# Patient Record
Sex: MALE | Employment: FULL TIME | URBAN - METROPOLITAN AREA
[De-identification: names, ages, dates, MRNs, and addresses within clinical notes are randomized per-mention and may not be internally consistent; named-entity substitution may affect disease eponyms.]

---

## 2023-02-19 PROBLEM — K20.0 EOSINOPHILIC ESOPHAGITIS: Status: ACTIVE | Noted: 2023-02-19

## 2023-02-19 PROBLEM — R63.4 ABNORMAL WEIGHT LOSS: Status: ACTIVE | Noted: 2023-02-19

## 2023-02-19 PROBLEM — D72.819 LEUKOPENIA: Status: ACTIVE | Noted: 2023-02-19

## 2023-02-19 PROBLEM — R09.A2 GLOBUS SENSATION: Status: ACTIVE | Noted: 2023-02-19

## 2023-02-19 PROBLEM — H69.90 EUSTACHIAN TUBE DYSFUNCTION: Status: ACTIVE | Noted: 2023-02-19

## 2023-02-19 PROBLEM — E78.5 DYSLIPIDEMIA: Status: ACTIVE | Noted: 2023-02-19

## 2023-02-19 PROBLEM — G46.7 DYSARTHRIA-CLUMSY HAND SYNDROME: Status: ACTIVE | Noted: 2023-02-19

## 2023-02-19 PROBLEM — R13.10 DYSPHAGIA: Status: ACTIVE | Noted: 2023-02-19

## 2023-02-19 PROBLEM — R20.2 PARESTHESIA: Status: ACTIVE | Noted: 2023-02-19

## 2023-02-19 PROBLEM — M54.10 BACK PAIN WITH RADICULOPATHY: Status: ACTIVE | Noted: 2023-02-19

## 2023-02-19 PROBLEM — E10.9 DIABETES TYPE I (MULTI): Status: ACTIVE | Noted: 2023-02-19

## 2023-02-19 PROBLEM — E10.65 UNCONTROLLED TYPE 1 DIABETES MELLITUS WITH HYPERGLYCEMIA (MULTI): Status: ACTIVE | Noted: 2023-02-19

## 2023-02-19 PROBLEM — R13.10 TROUBLE SWALLOWING: Status: ACTIVE | Noted: 2023-02-19

## 2023-02-19 PROBLEM — K21.9 GERD (GASTROESOPHAGEAL REFLUX DISEASE): Status: ACTIVE | Noted: 2023-02-19

## 2023-02-19 PROBLEM — R59.9 LYMPH NODE ENLARGEMENT: Status: ACTIVE | Noted: 2023-02-19

## 2023-02-19 PROBLEM — K04.7 DENTAL INFECTION: Status: ACTIVE | Noted: 2023-02-19

## 2023-02-19 PROBLEM — Z97.8 PRESENCE OF OTHER SPECIFIED DEVICES: Status: ACTIVE | Noted: 2023-02-19

## 2023-02-19 PROBLEM — E55.9 VITAMIN D DEFICIENCY: Status: ACTIVE | Noted: 2023-02-19

## 2023-02-19 PROBLEM — R53.83 FATIGUE: Status: ACTIVE | Noted: 2023-02-19

## 2023-02-19 RX ORDER — BLOOD-GLUCOSE METER
EACH MISCELLANEOUS
COMMUNITY
Start: 2016-06-06 | End: 2024-01-15

## 2023-02-19 RX ORDER — AA/PROT/LYSINE/METHIO/VIT C/B6 50-12.5 MG
TABLET ORAL
COMMUNITY

## 2023-02-19 RX ORDER — ATORVASTATIN CALCIUM 40 MG/1
1 TABLET, FILM COATED ORAL DAILY
COMMUNITY
Start: 2016-12-14

## 2023-02-19 RX ORDER — OMEPRAZOLE 20 MG/1
1 CAPSULE, DELAYED RELEASE ORAL DAILY
COMMUNITY
Start: 2022-11-03

## 2023-02-19 RX ORDER — FLUTICASONE PROPIONATE 50 MCG
2 SPRAY, SUSPENSION (ML) NASAL DAILY
COMMUNITY

## 2023-02-19 RX ORDER — INSULIN ASPART 100 [IU]/ML
INJECTION, SOLUTION INTRAVENOUS; SUBCUTANEOUS
COMMUNITY
Start: 2022-04-04

## 2023-02-19 RX ORDER — PEN NEEDLE, DIABETIC 30 GX3/16"
NEEDLE, DISPOSABLE MISCELLANEOUS
COMMUNITY

## 2023-02-19 RX ORDER — INSULIN GLARGINE 100 [IU]/ML
24 INJECTION, SOLUTION SUBCUTANEOUS
COMMUNITY
Start: 2021-04-23

## 2023-02-19 RX ORDER — CHOLECALCIFEROL (VITAMIN D3) 25 MCG
TABLET ORAL
COMMUNITY
Start: 2021-11-23

## 2023-03-13 ENCOUNTER — NUTRITION (OUTPATIENT)
Dept: PRIMARY CARE | Facility: CLINIC | Age: 43
End: 2023-03-13
Payer: COMMERCIAL

## 2023-03-13 DIAGNOSIS — E10.65 UNCONTROLLED TYPE 1 DIABETES MELLITUS WITH HYPERGLYCEMIA (MULTI): Primary | ICD-10-CM

## 2023-03-13 PROCEDURE — 99211 OFF/OP EST MAY X REQ PHY/QHP: CPT | Performed by: FAMILY MEDICINE

## 2023-03-21 VITALS — WEIGHT: 143 LBS | BODY MASS INDEX: 22.4 KG/M2

## 2023-03-26 NOTE — PROGRESS NOTES
Assessment     Reason for Visit:  Steven Nunez is a 42 y.o. male who presents for Nutrition Counseling (Pt here for MNT regarding T1DM and elevated BGs )    Anthropometrics:  Anthropometrics  Weight: 64.9 kg (143 lb)         Food And Nutrient Intake:  Food and Nutrient History  Food and Nutrient History: Pt has struggled to take appropriate amount of bolus insulin for >1 year. When first diagnosed, was more comfortable with I:C ratio and SS, but after some hypoglycemic events and rapid BG drops following exercise days, he is more worried than ever to take additional insulin at meals. This has impacted ability to lower A1c - recently reports at ENDO appointment, was 10.2%, from 10.8%. Working on counting carbs correctly, dosing I:C ratio correctly and taking appropriate SS - is usually taking 1U of SS or less.  Energy Intake: Fair 50-75 %  GI Symptoms: early satiety  GI Symptoms greater than 2 weeks: yes  Sleep Duration/Quality: 7+ hrs continuous     Food Intake  Meal 1: 1 pkt oatmeal or protein waffles/pancakes, chickpea-based cereal with milk, fruit - approx 30g carb - maybe up to 45g but not often - tries to have turkey sausage, protein with this meal  Meal 2: 30-45g rice or pasta with meat, vegetable, fat  Meal 3: 30-45g rice or pasta, potato, with meat, vegetable, fat, dairy  Snacks: 15g granola bar, cheese, nuts, snack packs  Food Variety: Present    Food Preparation  Cooking: Patient  Grocery Shopping: Patient  Dining Out: 1 to 3 times a month                                       Food Supplement Intake  Oral Nutrition Supplements:  (Was taking glucerna with meals to help BGs, and was using [employer] to increase kcal intake but is no longer using these products)           Food And Nutrient Administration:            Eating Environment  Location: Home, Other (comment) (work and ED parking lot when worried about using more bolus insulin (SS))           Factors:                         Physical  Activities:  Physical Activity  Physical Activity History: Used to enjoy running, does volleyball weekly and home projects on weekends - feels like he becomes very insulin sensative when exercising and changes insulin for 48 hours afterwards, which causes BGs to get into 300+ : has experience with hypoglycemia and rapid BG drops that increase his anxiety with exercise           Knowledge Beliefs Attitudes and Behavior       Beliefs and Attitudes  Beliefs and Attitudes: Motivation (SOC: contemplative)                               Nutrition Focused Physical Exam:           Energy Needs  Estimated Energy Needs  Total Energy Estimated Needs (kCal): 3500 kCal  Method for Estimating Needs: for weight restoration        Diagnosis      Nutrition Diagnosis  Patient has Nutrition Diagnosis: Yes  Nutrition Diagnosis 1: Altered nutrition related to laboratory values  Related to (1): anxiety over using SS  As Evidenced by (1): BGs >250    Interventions/Recommendations   Nutrition Education  Nutrition Education Content: Content related nutrition education  Goals: Discussed using SS during work week - he feels more comfortable doing this during week when he is less active - we talked about his sensativity after exercise and that it's likely only 24 hours or less, but using less insulin for 2 days is causing significant hyperglycemia. Education on SS discussed - motivated to do this given A1c of 10.2% and wanting to get on insulin pump in future.  Nutrition Counseling  Strategies: Nutrition counseling based on motivational interviewing strategy, Nutrition counseling based on goal setting strategy  Goals: Goal to use 2U of SS at weekday meals as is appropriate per BG - plans to focus on work days. Discussed upcoming travel also - talking to ENDO about changing basal insulin prn.        There are no Patient Instructions on file for this visit.    Monitoring and Evaluation   Food/Nutrient Related History Monitoring  Monitoring and  Evaluation Plan: Energy intake, Meal/snack pattern, Amount of food  Knowledge/Beliefs/Attitudes  Monitoring and Evaluation Plan: Food and nutrition knowledge, Behavior adherence (using bolus insulin accurately)        Time Spent  Time spent directly with patient, family or caregiver: 60 minutes  Documentation Time: 15 minutes        Readiness to Change : Good  Level of Understanding : Excellent  Anticipated Compliant : Good

## 2023-04-21 ENCOUNTER — APPOINTMENT (OUTPATIENT)
Dept: PRIMARY CARE | Facility: CLINIC | Age: 43
End: 2023-04-21
Payer: COMMERCIAL

## 2023-04-28 ENCOUNTER — NUTRITION (OUTPATIENT)
Dept: PRIMARY CARE | Facility: CLINIC | Age: 43
End: 2023-04-28
Payer: COMMERCIAL

## 2023-04-28 VITALS — BODY MASS INDEX: 21.93 KG/M2 | WEIGHT: 140 LBS

## 2023-04-28 DIAGNOSIS — E10.65 UNCONTROLLED TYPE 1 DIABETES MELLITUS WITH HYPERGLYCEMIA (MULTI): Primary | ICD-10-CM

## 2023-04-28 PROCEDURE — 99211 OFF/OP EST MAY X REQ PHY/QHP: CPT | Performed by: FAMILY MEDICINE

## 2023-04-28 NOTE — PROGRESS NOTES
Assessment     Reason for Visit:  Steven Nunez is a 42 y.o. male who presents for Nutrition Counseling (T1DM)    Anthropometrics:  Anthropometrics  Weight: 63.5 kg (140 lb)         Food And Nutrient Intake:  Food and Nutrient History  Food and Nutrient History: Pt reports BGs around 250-300mg/dL mostly. Feeling better about taking I:C ratio and 1-2U of SS. Met with Endo this morning and is changing I:C to 1:10g and feels comfortable with this. Also plans to start using SS (1/50>150) consistently after seeing Endo. Pt is feeling less anxious about hypoglycemia with more consistent eating and movement habits - trusting carb ratio and SS more right now. Went on vacation and managed BGs well - was >200 most of the trip but felt okay with this. Has a trip towards the end of June for white water rafting/hiking and wants to get BGs <200 before then, along with gaining 5# by then.  Energy Intake: Fair 50-75 %     Food Intake  Meal 1: 45g  Meal 2: 45g  Meal 3: 45g  Snacks: 30g 3-4x/d, hungry q 2 hours  Food Variety: Present (started adding more fruit, beans, lentils and different starches recently - has been tired of same foods for past several months)                                                        Food And Nutrient Administration:                        Factors:                         Physical Activities:              Knowledge Beliefs Attitudes and Behavior                                       Nutrition Focused Physical Exam:           Energy Needs  Estimated Energy Needs  Total Energy Estimated Needs (kCal): 3500 kCal  Method for Estimating Needs: weight restoration in setting of high activity        Diagnosis      Nutrition Diagnosis  Patient has Nutrition Diagnosis: Yes  Nutrition Diagnosis 1: Altered nutrition related to laboratory values  Related to (1): trouble using I:C ratio and SS consistently  As Evidenced by (1): BGs >250    Interventions/Recommendations   Nutrition Education  Nutrition Education  Content: Content related nutrition education  Goals: Discussed weight restoration will require BGs <200 consistently and increased calorie intake, especially with being hungry q 2 hours - may try boost/glucerna or [employer] again. Clarified questions on Mediterranean diet.  Nutrition Counseling  Strategies: Nutrition counseling based on motivational interviewing strategy, Nutrition counseling based on goal setting strategy  Goals: Discussed goals for new I:C ratio and using SS consistently, desire to gain weight, desire to lower BGs, goals for upcoming trip in June, goals for being able to run again this summer by getting BGS <250 consistently and regaining some weight/muscle before then, plans to resume keeping food/BG/insulin log.        There are no Patient Instructions on file for this visit.    Monitoring and Evaluation   Food/Nutrient Related History Monitoring  Monitoring and Evaluation Plan: Meal/snack pattern, Amount of food, Carbohydrate intake  Knowledge/Beliefs/Attitudes  Monitoring and Evaluation Plan: Food and nutrition knowledge, Beliefs and attitudes, Physical activity  Biochemical Data, Medical Tests and Procedures  Monitoring and Evaluation Plan: Glucose/endocrine profile        Time Spent  Time spent directly with patient, family or caregiver: 45 minutes  Documentation Time: 10 minutes        Readiness to Change : Good  Level of Understanding : Excellent  Anticipated Compliant : Excellent

## 2023-06-08 ENCOUNTER — OFFICE VISIT (OUTPATIENT)
Dept: PRIMARY CARE | Facility: CLINIC | Age: 43
End: 2023-06-08
Payer: COMMERCIAL

## 2023-06-08 VITALS
WEIGHT: 142 LBS | RESPIRATION RATE: 16 BRPM | BODY MASS INDEX: 22.24 KG/M2 | DIASTOLIC BLOOD PRESSURE: 70 MMHG | SYSTOLIC BLOOD PRESSURE: 116 MMHG | TEMPERATURE: 99.1 F | OXYGEN SATURATION: 99 % | HEART RATE: 100 BPM

## 2023-06-08 DIAGNOSIS — R23.4 FISSURE IN SKIN OF FOOT: Primary | ICD-10-CM

## 2023-06-08 PROCEDURE — 3074F SYST BP LT 130 MM HG: CPT | Performed by: NURSE PRACTITIONER

## 2023-06-08 PROCEDURE — 3078F DIAST BP <80 MM HG: CPT | Performed by: NURSE PRACTITIONER

## 2023-06-08 PROCEDURE — 99213 OFFICE O/P EST LOW 20 MIN: CPT | Performed by: NURSE PRACTITIONER

## 2023-06-08 PROCEDURE — 1036F TOBACCO NON-USER: CPT | Performed by: NURSE PRACTITIONER

## 2023-06-08 RX ORDER — MUPIROCIN 20 MG/G
OINTMENT TOPICAL 3 TIMES DAILY
Qty: 22 G | Refills: 0 | Status: SHIPPED | OUTPATIENT
Start: 2023-06-08 | End: 2023-06-18

## 2023-06-08 ASSESSMENT — ENCOUNTER SYMPTOMS
MUSCULOSKELETAL NEGATIVE: 1
CONSTITUTIONAL NEGATIVE: 1
RESPIRATORY NEGATIVE: 1

## 2023-06-08 NOTE — PROGRESS NOTES
"Subjective   Patient ID: Steven Nunez is a 42 y.o. male who presents for Toe Pain. Skin cracking and peeling.    For the past three months, patient has had right toe cracking and peeling. He has been using a \"file board\" to file off dead skin and has been putting a \"toe cap\" on it to keep it covered. It does not itch and it is not painful. He does play volleyball sometimes in the sand, but says that this happened before he played volleyball.          Review of Systems   Constitutional: Negative.    HENT: Negative.     Respiratory: Negative.     Genitourinary: Negative.    Musculoskeletal: Negative.    Skin:  Positive for rash.     Objective   /70   Pulse 100   Temp 37.3 °C (99.1 °F)   Resp 16   Wt 64.4 kg (142 lb)   SpO2 99%   BMI 22.24 kg/m²     Physical Exam  Vitals reviewed.   Constitutional:       General: He is not in acute distress.     Appearance: Normal appearance. He is not ill-appearing or toxic-appearing.   HENT:      Head: Atraumatic.   Cardiovascular:      Rate and Rhythm: Normal rate and regular rhythm.      Heart sounds: Normal heart sounds. No murmur heard.  Pulmonary:      Effort: Pulmonary effort is normal.      Breath sounds: Normal breath sounds.   Skin:     General: Skin is warm and dry.      Comments: Dry cracked skin of the right great toe with fissures. There is no purulent drainage or signs of infection. No redness, flaking or itchiness between toes   Neurological:      Mental Status: He is alert.     Assessment/Plan   Problem List Items Addressed This Visit    None  Visit Diagnoses       Fissure in skin of foot    -  Primary    Relevant Medications    mupirocin (Bactroban) 2 % ointment    Other Relevant Orders    Referral to Podiatry        Patient is a Type 1 Diabetic, so will get patient in to see podiatry for foot care and further management. Referral placed and  staff to help pt schedule.  Patient to continue to use filing board to file down dead skin. He may " apply thick emollient lotion to the area (like cetaphil). Will prescribe mupirocin for pt to put on the areas of cracked skin. Advised ER for any worsening of symptoms or new/concerning symptoms; he agreed.

## 2023-06-09 ENCOUNTER — NUTRITION (OUTPATIENT)
Dept: PRIMARY CARE | Facility: CLINIC | Age: 43
End: 2023-06-09
Payer: COMMERCIAL

## 2023-06-09 DIAGNOSIS — E10.65 UNCONTROLLED TYPE 1 DIABETES MELLITUS WITH HYPERGLYCEMIA (MULTI): Primary | ICD-10-CM

## 2023-06-09 PROCEDURE — 99211 OFF/OP EST MAY X REQ PHY/QHP: CPT | Performed by: FAMILY MEDICINE

## 2023-06-09 NOTE — PROGRESS NOTES
Assessment     Reason for Visit:  Steven Nunez is a 42 y.o. male who presents for Nutrition Counseling (T1DM)    Anthropometrics:            Food And Nutrient Intake:  Food and Nutrient History  Food and Nutrient History: Pt has not been logging food/carbs/insulin/BGs as he had hoped 2/2 family stress and travel. Reports BGS in 200-300 range, at 347mg/dL in office today. Struggling with counting carbs correctly at meals and using SS correctly - usually using 1-2 fewer units of insulin per carb count and 1-2 U of SS even when 3-4 may be recommended. Upcoming trip to [place] and worried about BG control - wants to not take any humalog for fear of hypoglycemia. No BGs <180 in many months. Worried about heart health with family hx and recent events. Reports 2# weight gain. Feeling better with using I:C ratio of 1:10 and SS but not at goal here yet.  Energy Intake: Fair 50-75 %     Food Intake  Meal 1: keto cereal with fruit and milk or oatmeal or eggs and toast  Meal 2: sandwich, potatoes with meat and vegs  Meal 3: vegetable, fish or chicken, potatoes rice or pasta  Snacks: [employer] and [employer] at times                                                        Food And Nutrient Administration:                        Factors:                         Physical Activities:  Physical Activity  Physical Activity History: Ran 2x in last month, walks dog daily, enjoys volleyball. Trip in June to hike and white water raft           Knowledge Beliefs Attitudes and Behavior                                       Nutrition Focused Physical Exam:           Energy Needs  Estimated Energy Needs  Total Energy Estimated Needs (kCal): 2600 kCal        Diagnosis      Nutrition Diagnosis  Patient has Nutrition Diagnosis: Yes  Nutrition Diagnosis 1: Food and nutrition related knowledge deficit  Related to (1): fear of hypglycemia  As Evidenced by (1): not taking adequate I:C ratio or SS at most  meals    Interventions/Recommendations   Nutrition Education  Nutrition Education Content: Content related nutrition education  Goals: Discussed heart healthy MNT, increasing omega-3 and fiber intake, reducing saturated fat intake as able. Explored specific examples of this for his usual intake and food choices. Education on taking insulin with meals for better BG control, especially on trip - pt is worried about this but agrees to try to use I:C ratio and SS as best he can.  Nutrition Counseling  Strategies: Nutrition counseling based on motivational interviewing strategy, Nutrition counseling based on goal setting strategy  Goals: Explored fears of low BG and naming evidence that he hasn't experienced this is some time. Discussed increase comfort with SS and I:C ratio - aiming to get BGs <200mg/dL consistently. Discussed how BGs impact heart health and wound healing. Goals for logging meals, carbs, insulin and BG discussed.Goals for taking I:C ratio and SS consistently, especially when traveling. Goals for increasing fiber and reducing BGs for heart health.        There are no Patient Instructions on file for this visit.    Monitoring and Evaluation   Food/Nutrient Related History Monitoring  Monitoring and Evaluation Plan: Carbohydrate intake, Fiber intake  Knowledge/Beliefs/Attitudes  Monitoring and Evaluation Plan: Beliefs and attitudes, Food and nutrition knowledge  Biochemical Data, Medical Tests and Procedures  Monitoring and Evaluation Plan: Glucose/endocrine profile        Time Spent  Prep time on day of patient encounter: 5 minutes  Time spent directly with patient, family or caregiver: 55 minutes  Additional Time Spent on Patient Care Activities: 0 minutes  Documentation Time: 10 minutes  Other Time Spent: 0 minutes  Total: 70 minutes

## 2023-06-12 ENCOUNTER — TELEPHONE (OUTPATIENT)
Dept: PRIMARY CARE | Facility: CLINIC | Age: 43
End: 2023-06-12
Payer: COMMERCIAL

## 2023-06-12 NOTE — TELEPHONE ENCOUNTER
Left message for patent reiterating pt's appt time and date. I asked pt to call us back to confirm that he got the message.    ----- Message from Zaira Monroe sent at 6/9/2023 12:35 PM EDT -----  Regarding: RE: podiatry referral  Patient scheduled to see Dr. Lindo on 6/19/23 at 4:10pm.  Left message on patient's phone with appointment information.  ----- Message -----  From: MELISSA Robertson  Sent: 6/8/2023   6:15 PM EDT  To: Zaira Monroe  Subject: podiatry referral                                I placed referral in for pt for podiatry. Can we please get him in ASAP? Let me know

## 2023-06-27 ENCOUNTER — APPOINTMENT (OUTPATIENT)
Dept: PRIMARY CARE | Facility: CLINIC | Age: 43
End: 2023-06-27
Payer: COMMERCIAL

## 2023-07-14 ENCOUNTER — NUTRITION (OUTPATIENT)
Dept: PRIMARY CARE | Facility: CLINIC | Age: 43
End: 2023-07-14
Payer: COMMERCIAL

## 2023-07-14 DIAGNOSIS — E10.65 UNCONTROLLED TYPE 1 DIABETES MELLITUS WITH HYPERGLYCEMIA (MULTI): Primary | ICD-10-CM

## 2023-07-14 PROCEDURE — 99211 OFF/OP EST MAY X REQ PHY/QHP: CPT | Performed by: FAMILY MEDICINE

## 2023-07-14 NOTE — PROGRESS NOTES
Assessment     Reason for Visit:  Steven Nunez is a 43 y.o. male who presents for Nutrition Counseling (T1DM)    Anthropometrics:            Food And Nutrient Intake:  Food and Nutrient History  Food and Nutrient History: Pt did well on recent vacation - tried to use SS and I:C ratio as much as he could. No low BGs. Noticing BG is easier to manage when closer to 200 than 300 range. Feeling better about using I:C and SS more often - struggles when busy at work. Struggles dosing insulin on weekends for fear of dropping quickly 2/2 being more active.  Energy Intake: Good > 75 %                                                              Food And Nutrient Administration:                        Factors:                         Physical Activities:              Knowledge Beliefs Attitudes and Behavior                                       Nutrition Focused Physical Exam:           Energy Needs           Diagnosis      Nutrition Diagnosis  Patient has Nutrition Diagnosis: Yes  Nutrition Diagnosis 1: Altered nutrition related to laboratory values  Related to (1): anxiety using I:C ratio and SS consistently  As Evidenced by (1): BGs >300    Interventions/Recommendations   Nutrition Counseling  Strategies: Nutrition counseling based on motivational interviewing strategy  Goals: Discussed improvements in feeling more comfortable dosing insulin correctly. Explored benefits he's noticed with BGs in lower 200 or under 200: less anxiety, improved mood, less fenzy-brain feeling, calmer overall, easier to control BG, less sensitive to exercise impact on BGs. Discussed desire to bolus insulin on weekends to prevent highs. Explored why it makes sense to do this. Discussed fears here, provided education and support. Explored talking to ENDO about reduced basal insulin on weekends if applicable.        There are no Patient Instructions on file for this visit.    Monitoring and Evaluation   Food/Nutrient Related History  Monitoring  Monitoring and Evaluation Plan: Amount of food, Meal/snack pattern, Carbohydrate intake  Knowledge/Beliefs/Attitudes  Monitoring and Evaluation Plan: Food and nutrition knowledge, Beliefs and attitudes, Physical activity  Biochemical Data, Medical Tests and Procedures  Monitoring and Evaluation Plan: Glucose/endocrine profile        Time Spent  Prep time on day of patient encounter: 5 minutes  Time spent directly with patient, family or caregiver: 50 minutes  Additional Time Spent on Patient Care Activities: 0 minutes  Documentation Time: 10 minutes  Other Time Spent: 0 minutes  Total: 65 minutes

## 2023-08-18 ENCOUNTER — APPOINTMENT (OUTPATIENT)
Dept: PRIMARY CARE | Facility: CLINIC | Age: 43
End: 2023-08-18
Payer: COMMERCIAL

## 2023-11-16 DIAGNOSIS — K21.9 GASTRO-ESOPHAGEAL REFLUX DISEASE WITHOUT ESOPHAGITIS: ICD-10-CM

## 2023-12-01 RX ORDER — FAMOTIDINE 20 MG/1
20 TABLET, FILM COATED ORAL DAILY PRN
Qty: 90 TABLET | Refills: 1 | Status: SHIPPED | OUTPATIENT
Start: 2023-12-01

## 2024-01-14 DIAGNOSIS — E10.65 UNCONTROLLED TYPE 1 DIABETES MELLITUS WITH HYPERGLYCEMIA (MULTI): Primary | ICD-10-CM

## 2024-01-15 RX ORDER — BLOOD-GLUCOSE METER
EACH MISCELLANEOUS
Qty: 900 STRIP | Refills: 1 | Status: SHIPPED | OUTPATIENT
Start: 2024-01-15

## 2024-02-19 ENCOUNTER — TELEPHONE (OUTPATIENT)
Dept: PEDIATRICS | Facility: CLINIC | Age: 44
End: 2024-02-19
Payer: COMMERCIAL

## 2024-02-19 DIAGNOSIS — U07.1 COVID-19 VIRUS INFECTION: Primary | ICD-10-CM

## 2024-02-19 NOTE — TELEPHONE ENCOUNTER
Pt sister states he tested positive for COVID yesterday, has body aches, chills and fatigue. No fever but is type 1 diabetic. He is asking for Paxlovid prescribed today.  [facility]

## 2024-07-05 DIAGNOSIS — K21.9 GASTRO-ESOPHAGEAL REFLUX DISEASE WITHOUT ESOPHAGITIS: ICD-10-CM

## 2024-07-05 RX ORDER — OMEPRAZOLE 20 MG/1
20 CAPSULE, DELAYED RELEASE ORAL DAILY
Qty: 90 CAPSULE | Refills: 3 | Status: SHIPPED | OUTPATIENT
Start: 2024-07-05

## 2024-09-25 ENCOUNTER — APPOINTMENT (OUTPATIENT)
Dept: CARDIOLOGY | Facility: CLINIC | Age: 44
End: 2024-09-25
Payer: COMMERCIAL

## 2024-09-25 ENCOUNTER — OFFICE VISIT (OUTPATIENT)
Dept: GASTROENTEROLOGY | Facility: CLINIC | Age: 44
End: 2024-09-25
Payer: COMMERCIAL

## 2024-09-25 VITALS
DIASTOLIC BLOOD PRESSURE: 82 MMHG | HEART RATE: 86 BPM | WEIGHT: 132 LBS | BODY MASS INDEX: 20.72 KG/M2 | SYSTOLIC BLOOD PRESSURE: 118 MMHG | HEIGHT: 67 IN

## 2024-09-25 VITALS
DIASTOLIC BLOOD PRESSURE: 86 MMHG | BODY MASS INDEX: 20.83 KG/M2 | WEIGHT: 133 LBS | HEART RATE: 93 BPM | SYSTOLIC BLOOD PRESSURE: 120 MMHG | TEMPERATURE: 98.5 F

## 2024-09-25 DIAGNOSIS — R00.2 PALPITATIONS: ICD-10-CM

## 2024-09-25 DIAGNOSIS — R05.8 OTHER COUGH: ICD-10-CM

## 2024-09-25 DIAGNOSIS — R14.2 BELCHING: ICD-10-CM

## 2024-09-25 DIAGNOSIS — R07.89 CHEST PRESSURE: Primary | ICD-10-CM

## 2024-09-25 DIAGNOSIS — R19.4 CHANGE IN BOWEL HABITS: ICD-10-CM

## 2024-09-25 DIAGNOSIS — R19.7 DIARRHEA, UNSPECIFIED TYPE: Primary | ICD-10-CM

## 2024-09-25 DIAGNOSIS — K20.90 ESOPHAGITIS: ICD-10-CM

## 2024-09-25 DIAGNOSIS — E78.5 DYSLIPIDEMIA: ICD-10-CM

## 2024-09-25 PROCEDURE — 99204 OFFICE O/P NEW MOD 45 MIN: CPT | Performed by: INTERNAL MEDICINE

## 2024-09-25 PROCEDURE — 3074F SYST BP LT 130 MM HG: CPT | Performed by: NURSE PRACTITIONER

## 2024-09-25 PROCEDURE — 99214 OFFICE O/P EST MOD 30 MIN: CPT | Performed by: NURSE PRACTITIONER

## 2024-09-25 PROCEDURE — 3079F DIAST BP 80-89 MM HG: CPT | Performed by: INTERNAL MEDICINE

## 2024-09-25 PROCEDURE — 3074F SYST BP LT 130 MM HG: CPT | Performed by: INTERNAL MEDICINE

## 2024-09-25 PROCEDURE — 1036F TOBACCO NON-USER: CPT | Performed by: INTERNAL MEDICINE

## 2024-09-25 PROCEDURE — 3008F BODY MASS INDEX DOCD: CPT | Performed by: INTERNAL MEDICINE

## 2024-09-25 PROCEDURE — 93000 ELECTROCARDIOGRAM COMPLETE: CPT | Performed by: INTERNAL MEDICINE

## 2024-09-25 PROCEDURE — 3079F DIAST BP 80-89 MM HG: CPT | Performed by: NURSE PRACTITIONER

## 2024-09-25 ASSESSMENT — ENCOUNTER SYMPTOMS
CHILLS: 0
COUGH: 0
FEVER: 0
ENDOCRINE NEGATIVE: 1
HEMATOLOGIC/LYMPHATIC NEGATIVE: 1
ROS GI COMMENTS: SEE HPI
DIFFICULTY URINATING: 0
DEPRESSION: 0
ALLERGIC/IMMUNOLOGIC NEGATIVE: 1
MUSCULOSKELETAL NEGATIVE: 1
EYES NEGATIVE: 1
DIAPHORESIS: 0
PSYCHIATRIC NEGATIVE: 1
CARDIOVASCULAR NEGATIVE: 1
CHEST TIGHTNESS: 0
SHORTNESS OF BREATH: 0
NEUROLOGICAL NEGATIVE: 1
FATIGUE: 0
RESPIRATORY NEGATIVE: 1
STRIDOR: 0
WHEEZING: 0
APNEA: 0

## 2024-09-25 NOTE — PROGRESS NOTES
"Referred by  No ref. provider found for Please see below.     History Of Present Illness:    Steven Nunez is a 44 y.o. male presenting with f chest discomfort, palpitations.    This 44-year-old type I diabetic with hyperlipidemia and a strong family history of CAD (father sustained an MI in his 50s, sister states sustained an MI in her 40s) is followed by Dr. Adams for primary care.  He also sees an endocrinologist at the [facility] who advised him to see preventive cardiology.    The patient reports he has moments of episodic chest pressure that occur from time to time.  The chest pressure first started several months ago.  The symptoms occur while he is lying on the couch while watching TV.  Usual duration of symptoms is an hour or so.  The pressure is mid sternal, and does not radiate into the neck, jaw, arms or eslewhere.  The typical duration is bout an hour, and typically he feels short of breath with the chest pressure.    He also has a separate feeling that feels like a pounding in the face and neck and \"hard heart beats\".  This feels like \"a mini panic attack\".  The palpitations occur once a week, are brief, and are not exertional.  The patient denies dyspnea,  orthopnea, PND, syncope, and near syncope.    He does not consume alcohol.  He does not have sleep apnea.  He denies symptoms of nonrestorative sleep.    He plays volleyball four days a week, and feels well when he exercises.        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    Past Surgical History:  He has a past surgical history that includes Adenoidectomy (04/06/2017) and Tonsillectomy (04/06/2017).      Social History:  He reports that he quit smoking about 12 years ago. His smoking use included cigarettes. He has never used smokeless tobacco. He reports that he does not " "currently use alcohol. He reports that he does not use drugs.    Family History:  Family History   Problem Relation Name Age of Onset    Other (Cardiac disorder) Father      Heart attack Father      Heart attack Sister  45    Other (cardiac stents) Sister          Allergies:  Ciprofloxacin    Outpatient Medications:  Current Outpatient Medications   Medication Instructions    atorvastatin (Lipitor) 40 mg tablet 1 tablet, oral, Daily    blood sugar diagnostic (OneTouch Verio test strips) strip USE to test blood sugars 12 TIMES DAILY    blood-glucose sensor (DEXCOM G6 SENSOR MISC) miscellaneous, Change sensor every 10 days.    blood-glucose transmitter (DEXCOM G6 TRANSMITTER MISC) miscellaneous, Change every 90 days.    cetirizine (ZYRTEC) 10 mg, oral, Daily    cholecalciferol (Vitamin D-3) 25 MCG (1000 UT) tablet Vitamin D3 25 MCG (1000 UT) Oral Tablet   Refills: 0        Start : 23-Nov-2021   Active    coenzyme Q-10 10 mg capsule Co Q 10 10 MG Oral Capsule   Refills: 0       Active    famotidine (PEPCID) 20 mg, oral, Daily PRN    fluticasone (Flonase) 50 mcg/actuation nasal spray 2 sprays, Each Nostril, Daily, Shake gently. Before first use, prime pump. After use, clean tip and replace cap.    glucagon 1 mg injection Inject 1mg as needed.    insulin aspart (NovoLOG FlexPen U-100 Insulin) 100 unit/mL (3 mL) pen Take 1 unit for 5 g carb bkfst and lunch and 2 unit for 15 g carb for dinner with sliding scale insulin (max 40 units a day)    insulin glargine (BASAGLAR KWIKPEN U-100 INSULIN) 24 Units, subcutaneous, Daily RT    omeprazole (PRILOSEC) 20 mg, oral, Daily    pen needle, diabetic (BD Ultra-Fine Kateryna Pen Needle) 32 gauge x 5/32\" needle miscellaneous, Use 8 times daily as directed.        Last Recorded Vitals:  There were no vitals filed for this visit.    Physical Exam:  GENERAL:  pleasant 44 year-old  HEENT: No xanthelasma  NECK: Supple, no palpable adenopathy or thyromegaly  CHEST: Clear to auscultation, " respiratory effort unlabored  CARDIAC: RRR, normal S1 and S2, no audible murmur, rub, gallop, carotids are brisk, PMI is not displaced  ABD: Active bowel sounds, nontender, no organomegaly, no evidence of ascites  EXT: No clubbing, cyanosis, edema, or tenderness  NEURO: Awake, alert, appropriate, speech is fluent         Last Labs:  CBC -  Lab Results   Component Value Date    WBC 3.8 (L) 10/25/2021    HGB 16.0 10/25/2021    HCT 46.6 10/25/2021    MCV 82 10/25/2021     10/25/2021       CMP -  Lab Results   Component Value Date    CALCIUM 9.6 03/23/2022    PROT 6.5 03/23/2022    ALBUMIN 4.6 03/23/2022    AST 21 03/23/2022    ALT 35 03/23/2022    ALKPHOS 67 03/23/2022    BILITOT 1.1 03/23/2022       LIPID PANEL -   Lab Results   Component Value Date    CHOL 170 03/23/2022    TRIG 61 03/23/2022    HDL 69.0 03/23/2022    CHHDL 2.5 03/23/2022    LDLF 89 03/23/2022    VLDL 12 03/23/2022       RENAL FUNCTION PANEL -   Lab Results   Component Value Date    GLUCOSE 138 (H) 03/23/2022     03/23/2022    K 3.5 03/23/2022     03/23/2022    CO2 34 (H) 03/23/2022    ANIONGAP 7 (L) 03/23/2022    BUN 15 03/23/2022    CREATININE 0.84 03/23/2022    GFRMALE >90 03/23/2022    CALCIUM 9.6 03/23/2022    ALBUMIN 4.6 03/23/2022        Lab Results   Component Value Date    HGBA1C 13.3 (H) 06/24/2024         Lab review: I have Lipids: Total cholesterol was 133, HDL 66, LDL 55, triglyceride level 62 in March 2024.  Lab Results   Component Value Date    CHOL 170 03/23/2022    HDL 69.0 03/23/2022    TRIG 61 03/23/2022       Assessment/Plan   Assessment & Plan  Chest pressure    Orders:    CT cardiac scoring wo IV contrast; Future    Follow Up In Cardiology; Future    Stress Test; Future    ECG 12 lead (Clinic Performed)    Palpitations    Orders:    Holter Or Event Cardiac Monitor; Future    Follow Up In Cardiology; Future    Dyslipidemia    Orders:    CT cardiac scoring wo IV contrast; Future    Follow Up In Cardiology;  Future            Asa Schroeder MD

## 2024-09-25 NOTE — PATIENT INSTRUCTIONS
"It was my pleasure to meet you.  I look forward to being your cardiologist.  I am a huge believer in communicating with my patients.  Please contact me at any time, if anything is not clear to you regarding anything we have discussed, or if new questions occur to you.     You should increase your intake of fresh fruits and vegetables.  Try to consume 9-12 servings per day of such foods.  You should increase your intake of deep sea fish such as salmon and tuna.  Try to get two servings per week of fish, but if you are a pregnant woman, talk to your obstetrician before increasing your fish intake.  You should increase your intake of unprocessed nuts such as walnuts or almonds.  Increase your intake of plant-based protein.  You should avoid fried foods.  Don't consume sugary or starchy foods and sugary drinks.  Avoid saturated fats.  Try not to dine at restaurants more than once per month, and don't dine at fast food places.  Try to get 7-9 hours of sleep every night.  Try to get 150 minutes per week of moderate intensity exercise (after I have cleared you to start an exercise program).  Try to maintain the appropriate weight for your height based on body mass index (BMI). Maintain your cholesterol, blood sugar, and blood pressure in the recommended respective normal ranges.  There is a wealth of information on the American Heart Association's website regarding this.  Just Google \"Life's Essential 8\" for more information.   Ask me about any of these details  if you have questions.    As your cardiologist, I will be available to you at any time to answer any question you have concerning your heart health.  My staff, Otto can also answer any questions you may have.  Best of luck.       It is important for us to have an accurate list of the medications, supplements, and their doses.  It is also important for us to have an accurate list of your allergies.  Please bring this information to every appointment.  " This is a vital part of the quality of care you receive through all of your providers.

## 2024-09-25 NOTE — ASSESSMENT & PLAN NOTE
Orders:    CT cardiac scoring wo IV contrast; Future    Follow Up In Cardiology; Future    Stress Test; Future    ECG 12 lead (Clinic Performed)

## 2024-09-25 NOTE — PATIENT INSTRUCTIONS
Check stool tests    You will be scheduled for a colonoscopy.  Please read all of the instructions 7 days before your colonoscopy.  You will need to take ONLY clear liquids the ENTIRE day before your procedure. These include (clear fruit juices, soda, Gatorade, broth, jello and coffee/tea) Avoid red and purple drinks. No cream or milk in the coffee.  You will need to take a bowel preparation.  You will also need a .      To schedule a procedure please call [phone]

## 2024-09-25 NOTE — PROGRESS NOTES
"Subjective   Patient ID: Steven Nunez is a 44 y.o. male who presents for Bloated (PT is having BM 8 to 10x a times when your his stomach is upset).   Last seen in GI on 6/2023 for dysphagia   PMH: Diabetes Type I, HLD     Presents today for diarrhea. This started about 5-6 months ago, intermittent. Will happen for 6-7 days/sometimes constipation has to have frequent Bm's.   Feels like he has to belch but he can not, there is excessive flatulence.     He denies dysphagia, odynophagia, denies heartburn or reflux, nausea, vomiting, dark tarry stools, rectal bleeding.   No antibiotics recently  No international travel, no camping/well water  No recent dietary changes  Has had some weight loss-but trying to control diabetes  Denies nocturnal Bm's     He reports lower abdominal discomfort-like a \"moving feeling\"  He denies abdominal pain    He is using Omeprazole     TSH in March was normal   CMP   Has  two dogs at home-looser stools-no giardia   Has not been running as much       EGD on 9/20. This showed LA grade B esophagitis, and biopsies show Note: Focal area approximately 25/hpf with deep granulation. The findings are suggestive but not diagnostic of eosinophilic esophagitis, as severe GERD also can induce increased eosinophils in the lower esophagus.     Family Hx: He denies family Hx of CRC, GI cancers, IBD, celiac       Review of Systems   Constitutional:  Negative for chills, diaphoresis, fatigue and fever.   HENT: Negative.     Eyes: Negative.    Respiratory: Negative.  Negative for apnea, cough, chest tightness, shortness of breath, wheezing and stridor.    Cardiovascular: Negative.    Gastrointestinal:         See HPI    Endocrine: Negative.    Genitourinary: Negative.  Negative for difficulty urinating.   Musculoskeletal: Negative.    Skin: Negative.    Allergic/Immunologic: Negative.    Neurological: Negative.    Hematological: Negative.    Psychiatric/Behavioral: Negative.         Objective   Physical " Exam  Constitutional:       Appearance: Normal appearance. He is normal weight.   HENT:      Nose: Nose normal.   Eyes:      General: Lids are normal.   Cardiovascular:      Rate and Rhythm: Regular rhythm.   Pulmonary:      Effort: Pulmonary effort is normal.   Musculoskeletal:         General: Normal range of motion.   Neurological:      Mental Status: He is alert and oriented to person, place, and time.   Psychiatric:         Mood and Affect: Mood normal.         Assessment/Plan   Diagnoses and all orders for this visit:  Diarrhea, unspecified type  -     Tissue Transglutaminase IgA; Future  -     Calprotectin, Fecal; Future  -     C-Reactive Protein; Future  -     Stool Pathogen Panel, PCR; Future  -     Colonoscopy Screening; Average Risk Patient; Future  Belching  -     Esophagogastroduodenoscopy (EGD); Future  Esophagitis  -     Esophagogastroduodenoscopy (EGD); Future  Other cough  -     Esophagogastroduodenoscopy (EGD); Future  Change in bowel habits  -     Colonoscopy Screening; Average Risk Patient; Future         This is a 44 year old male with a PMH of HLD, type 1 DM. Last seen in GI on 6/2023 for dysphagia. At that time he underwent an EGD (9/2021) and found to have esophagitis LA grade B,  25 eosinophils per HPF. As far as the swallowing, his symptoms have mostly improved with Omeprazole. However he does have difficulty belching. a cough and intermittent reflux despite PPI. He will undergo an EGD to r/o narrowing.     Today he presents with diarrhea. Diarrhea started about 5-6 months, intermittent, He has diarrhea 6-7 days/week with up to 10 bowel movements a day, followed by intermittent constipation, thin stools and hard stools. He has had some weight loss he associates with trying to control diabetes. Complete colonoscopy.

## 2024-10-03 ENCOUNTER — LAB (OUTPATIENT)
Dept: LAB | Facility: LAB | Age: 44
End: 2024-10-03
Payer: COMMERCIAL

## 2024-10-03 DIAGNOSIS — R14.2 BELCHING: Primary | ICD-10-CM

## 2024-10-03 DIAGNOSIS — R19.7 DIARRHEA, UNSPECIFIED TYPE: ICD-10-CM

## 2024-10-03 LAB
CRP SERPL-MCNC: <0.1 MG/DL
TTG IGA SER IA-ACNC: <1 U/ML

## 2024-10-03 PROCEDURE — 86140 C-REACTIVE PROTEIN: CPT

## 2024-10-03 PROCEDURE — 87506 IADNA-DNA/RNA PROBE TQ 6-11: CPT

## 2024-10-03 PROCEDURE — 83993 ASSAY FOR CALPROTECTIN FECAL: CPT

## 2024-10-03 PROCEDURE — 83516 IMMUNOASSAY NONANTIBODY: CPT

## 2024-10-03 PROCEDURE — 36415 COLL VENOUS BLD VENIPUNCTURE: CPT

## 2024-10-04 LAB

## 2024-10-06 LAB — CALPROTECTIN STL-MCNT: 16 UG/G

## 2024-10-08 ENCOUNTER — HOSPITAL ENCOUNTER (OUTPATIENT)
Dept: CARDIOLOGY | Facility: HOSPITAL | Age: 44
Discharge: HOME | End: 2024-10-08
Payer: COMMERCIAL

## 2024-10-08 DIAGNOSIS — R00.2 PALPITATIONS: ICD-10-CM

## 2024-10-08 PROCEDURE — 93242 EXT ECG>48HR<7D RECORDING: CPT

## 2024-11-01 DIAGNOSIS — Z12.11 COLON CANCER SCREENING: ICD-10-CM

## 2024-11-01 PROBLEM — R74.8 ELEVATED LIVER ENZYMES: Status: ACTIVE | Noted: 2024-06-25

## 2024-11-01 PROBLEM — B35.3 TINEA PEDIS OF BOTH FEET: Status: ACTIVE | Noted: 2024-11-01

## 2024-11-01 PROBLEM — Z97.8 USES SELF-APPLIED CONTINUOUS GLUCOSE MONITORING DEVICE: Status: ACTIVE | Noted: 2024-11-01

## 2024-11-01 PROBLEM — R03.0 ELEVATED BLOOD PRESSURE READING WITHOUT DIAGNOSIS OF HYPERTENSION: Status: ACTIVE | Noted: 2024-06-25

## 2024-11-04 RX ORDER — POLYETHYLENE GLYCOL 3350, SODIUM SULFATE ANHYDROUS, SODIUM BICARBONATE, SODIUM CHLORIDE, POTASSIUM CHLORIDE 236; 22.74; 6.74; 5.86; 2.97 G/4L; G/4L; G/4L; G/4L; G/4L
4000 POWDER, FOR SOLUTION ORAL ONCE
Qty: 4000 ML | Refills: 0 | Status: SHIPPED | OUTPATIENT
Start: 2024-11-04 | End: 2024-11-04

## 2024-11-08 ENCOUNTER — HOSPITAL ENCOUNTER (OUTPATIENT)
Dept: CARDIOLOGY | Facility: HOSPITAL | Age: 44
Discharge: HOME | End: 2024-11-08
Payer: COMMERCIAL

## 2024-11-08 DIAGNOSIS — R07.89 CHEST PRESSURE: ICD-10-CM

## 2024-11-08 PROCEDURE — 93016 CV STRESS TEST SUPVJ ONLY: CPT | Performed by: INTERNAL MEDICINE

## 2024-11-08 PROCEDURE — 93017 CV STRESS TEST TRACING ONLY: CPT

## 2024-11-08 PROCEDURE — 93018 CV STRESS TEST I&R ONLY: CPT | Performed by: INTERNAL MEDICINE

## 2024-11-12 ENCOUNTER — HOSPITAL ENCOUNTER (OUTPATIENT)
Dept: RADIOLOGY | Facility: HOSPITAL | Age: 44
Discharge: HOME | End: 2024-11-12
Payer: COMMERCIAL

## 2024-11-12 DIAGNOSIS — E78.5 DYSLIPIDEMIA: ICD-10-CM

## 2024-11-12 DIAGNOSIS — R07.89 CHEST PRESSURE: ICD-10-CM

## 2024-11-12 PROCEDURE — 75571 CT HRT W/O DYE W/CA TEST: CPT

## 2024-11-18 ENCOUNTER — APPOINTMENT (OUTPATIENT)
Dept: CARDIOLOGY | Facility: CLINIC | Age: 44
End: 2024-11-18
Payer: COMMERCIAL

## 2024-11-21 ENCOUNTER — ANESTHESIA EVENT (OUTPATIENT)
Dept: GASTROENTEROLOGY | Facility: EXTERNAL LOCATION | Age: 44
End: 2024-11-21

## 2024-11-25 ENCOUNTER — APPOINTMENT (OUTPATIENT)
Dept: CARDIOLOGY | Facility: CLINIC | Age: 44
End: 2024-11-25
Payer: COMMERCIAL

## 2024-11-25 VITALS
WEIGHT: 131 LBS | HEART RATE: 84 BPM | HEIGHT: 67 IN | SYSTOLIC BLOOD PRESSURE: 100 MMHG | BODY MASS INDEX: 20.56 KG/M2 | DIASTOLIC BLOOD PRESSURE: 74 MMHG | OXYGEN SATURATION: 99 %

## 2024-11-25 DIAGNOSIS — R00.2 PALPITATIONS: ICD-10-CM

## 2024-11-25 DIAGNOSIS — E78.5 DYSLIPIDEMIA: ICD-10-CM

## 2024-11-25 DIAGNOSIS — R07.89 CHEST PRESSURE: ICD-10-CM

## 2024-11-25 PROCEDURE — 3078F DIAST BP <80 MM HG: CPT | Performed by: INTERNAL MEDICINE

## 2024-11-25 PROCEDURE — 1036F TOBACCO NON-USER: CPT | Performed by: INTERNAL MEDICINE

## 2024-11-25 PROCEDURE — 3008F BODY MASS INDEX DOCD: CPT | Performed by: INTERNAL MEDICINE

## 2024-11-25 PROCEDURE — 99213 OFFICE O/P EST LOW 20 MIN: CPT | Performed by: INTERNAL MEDICINE

## 2024-11-25 PROCEDURE — 3074F SYST BP LT 130 MM HG: CPT | Performed by: INTERNAL MEDICINE

## 2024-11-25 NOTE — PATIENT INSTRUCTIONS

## 2024-11-25 NOTE — PROGRESS NOTES
"Chief Complaint:   please see below      History Of Present Illness:    Steven Nunez is a 44 y.o. male presenting with hyperlipidemia.    This 44-year-old type I diabetic with hyperlipidemia and a family history of CAD (father sustained an MI in his 50s, sister states sustained an MI in her 40s) is followed by Dr. Adams for primary care. He also sees an endocrinologist at the [facility] who advised him to see preventive cardiology.   The patient's stress test on 44/8/2024 disclosed no ischemia at 13.4 METS.  The patient's monitor study done 11/2024 disclosed no pathologic arrhythmia .  The patients's coronary artery calcium score in 11/2024 was 0.    He has no new cardiac issues or complaints.     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    Past Surgical History:  He has a past surgical history that includes Adenoidectomy (04/06/2017) and Tonsillectomy (04/06/2017).      Social History:  He reports that he quit smoking about 12 years ago. His smoking use included cigarettes. He has never used smokeless tobacco. He reports that he does not currently use alcohol. He reports that he does not use drugs.    Family History:  Family History   Problem Relation Name Age of Onset    Other (Cardiac disorder) Father      Heart attack Father      Heart attack Sister  45    Other (cardiac stents) Sister          Allergies:  Ciprofloxacin    Outpatient Medications:  Current Outpatient Medications   Medication Instructions    atorvastatin (Lipitor) 40 mg tablet 1 tablet, Daily    blood sugar diagnostic (OneTouch Verio test strips) strip USE to test blood sugars 12 TIMES " "DAILY    blood-glucose sensor (DEXCOM G6 SENSOR MISC) Change sensor every 10 days.    blood-glucose transmitter (DEXCOM G6 TRANSMITTER MISC) Change every 90 days.    cetirizine (ZYRTEC) 10 mg, Daily    cholecalciferol (Vitamin D-3) 25 MCG (1000 UT) tablet Vitamin D3 25 MCG (1000 UT) Oral Tablet   Refills: 0        Start : 23-Nov-2021   Active    coenzyme Q-10 10 mg capsule Co Q 10 10 MG Oral Capsule   Refills: 0       Active    famotidine (PEPCID) 20 mg, oral, Daily PRN    fluticasone (Flonase) 50 mcg/actuation nasal spray 2 sprays, Daily    glucagon 1 mg injection Inject 1mg as needed.    insulin aspart (NovoLOG FlexPen U-100 Insulin) 100 unit/mL (3 mL) pen Take 1 unit for 5 g carb bkfst and lunch and 2 unit for 15 g carb for dinner with sliding scale insulin (max 40 units a day)    insulin glargine (BASAGLAR KWIKPEN U-100 INSULIN) 24 Units, Daily RT    omeprazole (PRILOSEC) 20 mg, oral, Daily    pen needle, diabetic (BD Ultra-Fine Kateryna Pen Needle) 32 gauge x 5/32\" needle Use 8 times daily as directed.       Physical Exam:  CHEST: Clear to auscultation  CARDIAC: Regular rhythm and rate, normal S1 and S2, no murmur rub or gallop; carotids are brisk without bruits, PMI is not displaced  ABDOMEN: Active bowel sounds, no tenderness, no evidence of ascites  EXTREMITIES: No clubbing, cyanosis, edema, or tenderness       Last Labs:  CBC -  Lab Results   Component Value Date    WBC 3.8 (L) 10/25/2021    HGB 16.0 10/25/2021    HCT 46.6 10/25/2021    MCV 82 10/25/2021     10/25/2021       CMP -  Lab Results   Component Value Date    CALCIUM 9.6 03/23/2022    PROT 6.5 03/23/2022    ALBUMIN 4.6 03/23/2022    AST 21 03/23/2022    ALT 35 03/23/2022    ALKPHOS 67 03/23/2022    BILITOT 1.1 03/23/2022       LIPID PANEL -   Lab Results   Component Value Date    CHOL 170 03/23/2022    TRIG 61 03/23/2022    HDL 69.0 03/23/2022    CHHDL 2.5 03/23/2022    LDLF 89 03/23/2022    VLDL 12 03/23/2022       RENAL FUNCTION PANEL -   Lab " Results   Component Value Date    GLUCOSE 138 (H) 03/23/2022     03/23/2022    K 3.5 03/23/2022     03/23/2022    CO2 34 (H) 03/23/2022    ANIONGAP 7 (L) 03/23/2022    BUN 15 03/23/2022    CREATININE 0.84 03/23/2022    GFRMALE >90 03/23/2022    CALCIUM 9.6 03/23/2022    ALBUMIN 4.6 03/23/2022        Lab Results   Component Value Date    HGBA1C 13.3 (H) 06/24/2024         Diagnostic review: I have independently interpreted the Holter Monitor and stress test .  My findings are as summarized in the reports.    Assessment/Plan   Assessment & Plan  Chest pressure    Orders:    Follow Up In Cardiology    Palpitations    Orders:    Follow Up In Cardiology    Dyslipidemia    Orders:    Follow Up In Cardiology          Asa Schroeder MD

## 2024-12-03 ENCOUNTER — APPOINTMENT (OUTPATIENT)
Dept: GASTROENTEROLOGY | Facility: EXTERNAL LOCATION | Age: 44
End: 2024-12-03
Payer: COMMERCIAL

## 2024-12-03 ENCOUNTER — ANESTHESIA (OUTPATIENT)
Dept: GASTROENTEROLOGY | Facility: EXTERNAL LOCATION | Age: 44
End: 2024-12-03

## 2024-12-03 VITALS
SYSTOLIC BLOOD PRESSURE: 116 MMHG | WEIGHT: 130 LBS | BODY MASS INDEX: 20.4 KG/M2 | RESPIRATION RATE: 12 BRPM | TEMPERATURE: 98.1 F | HEIGHT: 67 IN | DIASTOLIC BLOOD PRESSURE: 72 MMHG | OXYGEN SATURATION: 98 % | HEART RATE: 81 BPM

## 2024-12-03 DIAGNOSIS — K20.90 ESOPHAGITIS: ICD-10-CM

## 2024-12-03 DIAGNOSIS — R19.7 DIARRHEA, UNSPECIFIED TYPE: ICD-10-CM

## 2024-12-03 DIAGNOSIS — R19.4 CHANGE IN BOWEL HABITS: ICD-10-CM

## 2024-12-03 DIAGNOSIS — R05.8 OTHER COUGH: ICD-10-CM

## 2024-12-03 DIAGNOSIS — R14.2 BELCHING: ICD-10-CM

## 2024-12-03 LAB — GLUCOSE: 281

## 2024-12-03 PROCEDURE — 45385 COLONOSCOPY W/LESION REMOVAL: CPT | Performed by: STUDENT IN AN ORGANIZED HEALTH CARE EDUCATION/TRAINING PROGRAM

## 2024-12-03 PROCEDURE — 43239 EGD BIOPSY SINGLE/MULTIPLE: CPT | Performed by: STUDENT IN AN ORGANIZED HEALTH CARE EDUCATION/TRAINING PROGRAM

## 2024-12-03 PROCEDURE — 45380 COLONOSCOPY AND BIOPSY: CPT | Performed by: STUDENT IN AN ORGANIZED HEALTH CARE EDUCATION/TRAINING PROGRAM

## 2024-12-03 PROCEDURE — 88305 TISSUE EXAM BY PATHOLOGIST: CPT | Mod: TC,ELYLAB | Performed by: STUDENT IN AN ORGANIZED HEALTH CARE EDUCATION/TRAINING PROGRAM

## 2024-12-03 RX ORDER — PROPOFOL 10 MG/ML
INJECTION, EMULSION INTRAVENOUS AS NEEDED
Status: DISCONTINUED | OUTPATIENT
Start: 2024-12-03 | End: 2024-12-03

## 2024-12-03 RX ORDER — SODIUM CHLORIDE 9 MG/ML
INJECTION, SOLUTION INTRAVENOUS CONTINUOUS PRN
Status: DISCONTINUED | OUTPATIENT
Start: 2024-12-03 | End: 2024-12-03

## 2024-12-03 RX ORDER — ONDANSETRON HYDROCHLORIDE 2 MG/ML
4 INJECTION, SOLUTION INTRAVENOUS ONCE AS NEEDED
Status: DISCONTINUED | OUTPATIENT
Start: 2024-12-03 | End: 2024-12-04 | Stop reason: HOSPADM

## 2024-12-03 RX ORDER — LIDOCAINE HYDROCHLORIDE 20 MG/ML
INJECTION, SOLUTION INFILTRATION; PERINEURAL AS NEEDED
Status: DISCONTINUED | OUTPATIENT
Start: 2024-12-03 | End: 2024-12-03

## 2024-12-03 SDOH — HEALTH STABILITY: MENTAL HEALTH: CURRENT SMOKER: 0

## 2024-12-03 ASSESSMENT — PAIN SCALES - GENERAL
PAINLEVEL_OUTOF10: 0 - NO PAIN
PAIN_LEVEL: 0
PAINLEVEL_OUTOF10: 0 - NO PAIN

## 2024-12-03 ASSESSMENT — PAIN - FUNCTIONAL ASSESSMENT
PAIN_FUNCTIONAL_ASSESSMENT: 0-10

## 2024-12-03 ASSESSMENT — COLUMBIA-SUICIDE SEVERITY RATING SCALE - C-SSRS
6. HAVE YOU EVER DONE ANYTHING, STARTED TO DO ANYTHING, OR PREPARED TO DO ANYTHING TO END YOUR LIFE?: NO
1. IN THE PAST MONTH, HAVE YOU WISHED YOU WERE DEAD OR WISHED YOU COULD GO TO SLEEP AND NOT WAKE UP?: NO
2. HAVE YOU ACTUALLY HAD ANY THOUGHTS OF KILLING YOURSELF?: NO

## 2024-12-03 NOTE — DISCHARGE INSTRUCTIONS

## 2024-12-03 NOTE — H&P
Procedure H&P    Patient Profile-Procedures  Name Steven Nunez  Date of Birth 1980  MRN [id number]  Address   [address] [place] [id number] [address] [place] [address]    Primary Phone Number [phone]  Secondary Phone Number    PCP Asher Adams    Procedure(s):  Procedures: EGD and Colonoscopy  Primary contact name and number   Extended Emergency Contact Information  Primary Emergency Contact: Jewel Nunez  Home Phone: [phone]  Work Phone: [phone]  Relation: Parent    General Health  Weight   Vitals:    12/03/24 1015   Weight: 59 kg (130 lb)     BMI Body mass index is 20.36 kg/m².    Allergies  Allergies   Allergen Reactions    Ciprofloxacin Unknown       Past Medical History   Past Medical History:   Diagnosis Date    Hyperlipidemia     Ophthalmoplegic migraine, not intractable 04/06/2017    Migraine, ophthalmoplegic    Personal history of other endocrine, nutritional and metabolic disease 09/20/2019    History of diabetes mellitus       Provider assessment  Diagnosis:  diarrhea an dysphagia   Medication Reviewed - yes  Prior to Admission medications    Medication Sig Start Date End Date Taking? Authorizing Provider   atorvastatin (Lipitor) 40 mg tablet Take 1 tablet (40 mg) by mouth once daily. 12/14/16  Yes Historical Provider, MD   blood sugar diagnostic (OneTouch Verio test strips) strip USE to test blood sugars 12 TIMES DAILY 1/15/24  Yes Stephani Gilman MD   cholecalciferol (Vitamin D-3) 25 MCG (1000 UT) tablet Vitamin D3 25 MCG (1000 UT) Oral Tablet   Refills: 0        Start : 23-Nov-2021   Active 11/23/21  Yes Historical Provider, MD   coenzyme Q-10 10 mg capsule Co Q 10 10 MG Oral Capsule   Refills: 0       Active   Yes Historical Provider, MD   insulin aspart (NovoLOG FlexPen U-100 Insulin) 100 unit/mL (3 mL) pen Take 1 unit for 5 g carb bkfst and lunch and 2 unit for 15 g carb for dinner with sliding scale insulin (max 40 units a day) 4/4/22  Yes Historical Provider, MD  "  insulin glargine (Basaglar KwikPen U-100 Insulin) 100 unit/mL (3 mL) pen Inject 24 Units under the skin once daily. 4/23/21  Yes Historical Provider, MD   omeprazole (PriLOSEC) 20 mg DR capsule TAKE 1 CAPSULE BY MOUTH EVERY DAY 7/5/24  Yes MELISSA Love   pen needle, diabetic (BD Ultra-Fine Kateryna Pen Needle) 32 gauge x 5/32\" needle Use 8 times daily as directed.   Yes Historical Provider, MD   blood-glucose sensor (DEXCOM G6 SENSOR MISC) Change sensor every 10 days.  Patient not taking: Reported on 12/3/2024    Historical Provider, MD   blood-glucose transmitter (DEXCOM G6 TRANSMITTER MISC) Change every 90 days.  Patient not taking: Reported on 12/3/2024    Historical Provider, MD   cetirizine (ZyrTEC) 10 mg capsule Take 1 capsule (10 mg) by mouth once daily.  Patient not taking: Reported on 12/3/2024    Historical Provider, MD   famotidine (Pepcid) 20 mg tablet TAKE 1 TABLET BY MOUTH EVERY DAY AS NEEDED  Patient not taking: Reported on 12/3/2024 12/1/23   MELISSA Love   fluticasone (Flonase) 50 mcg/actuation nasal spray Administer 2 sprays into each nostril once daily. Shake gently. Before first use, prime pump. After use, clean tip and replace cap.  Patient not taking: Reported on 12/3/2024    Historical Provider, MD   glucagon 1 mg injection Inject 1mg as needed. 12/14/16   Historical Provider, MD       Physical Exam  Vitals:    12/03/24 1015   BP: 122/81   Pulse: 92   Resp: 16   Temp: 37 °C (98.6 °F)   SpO2: 99%        General: A&Ox3, NAD.  HEENT: AT/NC.   CV: RRR. No murmur.  Resp: CTA bilaterally. No wheezing, rhonchi or rales.   GI: Soft, NT/ND. BSx4.  Extrem: No edema. Pulses intact.  Neuro: No focal deficits.   Psych: Normal mood and affect.      Procedure Plan - pre-procedural (re)assesment completed by physician:  discharge/transfer patient when discharge criteria met    Tejal Chambers MD  12/3/2024 10:55 AM      "

## 2024-12-03 NOTE — ANESTHESIA PREPROCEDURE EVALUATION
Patient: Steven Nunez    Procedure Information       Date/Time: 12/03/24 1120    Scheduled providers: Tejal Chambers MD; ROMMEL Burns-CRNA    Procedures:       EGD      COLONOSCOPY    Location: [facility]            Relevant Problems   GI   (+) Dysphagia   (+) GERD (gastroesophageal reflux disease)   (+) Trouble swallowing      Endocrine   (+) Diabetes type I (Multi)   (+) Uncontrolled type 1 diabetes mellitus with hyperglycemia      ID   (+) Dental infection   (+) Tinea pedis of both feet       Clinical information reviewed:   Tobacco  Allergies  Meds   Med Hx  Surg Hx   Fam Hx  Soc Hx        NPO Detail:  NPO/Void Status  Date of Last Liquid: 12/03/24  Time of Last Liquid: 0600  Date of Last Solid: 12/01/24  Time of Last Solid: 2359  Last Intake Type: Clear fluids         Physical Exam    Airway  Mallampati: I  TM distance: >3 FB  Neck ROM: full  Comments: Short beard   Cardiovascular - normal exam     Dental - normal exam     Pulmonary - normal exam     Abdominal - normal exam       Other findings: Bs 300; Had apple juice and prescribed insulin at home this AM.  Has dexcom unit and is taking 2 Units of insulin. BS to be tested prior to procedure.  At  at 1053.      Anesthesia Plan    History of general anesthesia?: yes  History of complications of general anesthesia?: no    ASA 2     MAC   (Preoxygenated 2L prior to procedure.  Patient positioned self to comfort prior to sedation administered; eyes closed; continuous monitoring.)  The patient is not a current smoker.    intravenous induction   Anesthetic plan and risks discussed with patient.    Plan discussed with CRNA.

## 2024-12-03 NOTE — ANESTHESIA POSTPROCEDURE EVALUATION
Patient: Steven Nunez    Procedure Summary       Date: 12/03/24 Room / Location: [facility]    Anesthesia Start: 1104 Anesthesia Stop:     Procedures:       EGD      COLONOSCOPY Diagnosis:       Belching      Esophagitis      Other cough      Diarrhea, unspecified type      Change in bowel habits    Scheduled Providers: Tejal Chambers MD; RALPH Burns Responsible Provider: RALPH Burns    Anesthesia Type: MAC ASA Status: 2            Anesthesia Type: MAC    Vitals Value Taken Time   BP 95/57 12/03/24 1154   Temp 37 12/03/24 1154   Pulse 82 12/03/24 1154   Resp 10 12/03/24 1154   SpO2 97 12/03/24 1154       Anesthesia Post Evaluation    Patient location during evaluation: bedside  Patient participation: waiting for patient participation  Level of consciousness: awake  Pain score: 0  Pain management: adequate  Airway patency: patent  Cardiovascular status: acceptable  Respiratory status: acceptable and room air  Hydration status: acceptable  Postoperative Nausea and Vomiting: none      No notable events documented.

## 2024-12-04 ASSESSMENT — PAIN SCALES - GENERAL: PAINLEVEL_OUTOF10: 0 - NO PAIN

## 2024-12-12 LAB
LABORATORY COMMENT REPORT: NORMAL
PATH REPORT.FINAL DX SPEC: NORMAL
PATH REPORT.GROSS SPEC: NORMAL
PATH REPORT.RELEVANT HX SPEC: NORMAL
PATH REPORT.TOTAL CANCER: NORMAL

## 2025-01-09 DIAGNOSIS — K20.90 ESOPHAGITIS: Primary | ICD-10-CM

## 2025-01-11 ENCOUNTER — OFFICE VISIT (OUTPATIENT)
Dept: URGENT CARE | Age: 45
End: 2025-01-11
Payer: COMMERCIAL

## 2025-01-11 VITALS
RESPIRATION RATE: 16 BRPM | TEMPERATURE: 97.7 F | DIASTOLIC BLOOD PRESSURE: 78 MMHG | HEART RATE: 96 BPM | OXYGEN SATURATION: 97 % | SYSTOLIC BLOOD PRESSURE: 117 MMHG | BODY MASS INDEX: 20.36 KG/M2 | WEIGHT: 130 LBS

## 2025-01-11 DIAGNOSIS — R05.1 ACUTE COUGH: ICD-10-CM

## 2025-01-11 DIAGNOSIS — J01.00 ACUTE NON-RECURRENT MAXILLARY SINUSITIS: Primary | ICD-10-CM

## 2025-01-11 RX ORDER — AZITHROMYCIN 250 MG/1
TABLET, FILM COATED ORAL
Qty: 6 TABLET | Refills: 0 | Status: SHIPPED | OUTPATIENT
Start: 2025-01-11 | End: 2025-01-16

## 2025-01-11 RX ORDER — FLUTICASONE PROPIONATE 50 MCG
1 SPRAY, SUSPENSION (ML) NASAL DAILY
Qty: 16 G | Refills: 0 | Status: SHIPPED | OUTPATIENT
Start: 2025-01-11 | End: 2026-01-11

## 2025-01-11 RX ORDER — BROMPHENIRAMINE MALEATE, PSEUDOEPHEDRINE HYDROCHLORIDE, AND DEXTROMETHORPHAN HYDROBROMIDE 2; 30; 10 MG/5ML; MG/5ML; MG/5ML
10 SYRUP ORAL 4 TIMES DAILY PRN
Qty: 200 ML | Refills: 0 | Status: SHIPPED | OUTPATIENT
Start: 2025-01-11 | End: 2025-01-16

## 2025-01-11 ASSESSMENT — ENCOUNTER SYMPTOMS: COUGH: 1

## 2025-01-11 NOTE — PROGRESS NOTES
Subjective   Patient ID: Steven Nunez is a 44 y.o. male. They present today with a chief complaint of Cough.    History of Present Illness  PT presents with cough and sinus pressure x 3 weeks. No fever, no sob, cp or pain with deep inspiration. No other associated symptoms or concerns to address at this time. Has been using OTC medications with no improvement.       Cough        Past Medical History  Allergies as of 01/11/2025 - Reviewed 01/11/2025   Allergen Reaction Noted    Ciprofloxacin Unknown 02/19/2023       (Not in a hospital admission)       Past Medical History:   Diagnosis Date    Hyperlipidemia     Ophthalmoplegic migraine, not intractable 04/06/2017    Migraine, ophthalmoplegic    Personal history of other endocrine, nutritional and metabolic disease 09/20/2019    History of diabetes mellitus       Past Surgical History:   Procedure Laterality Date    ADENOIDECTOMY  04/06/2017    Adenoidectomy    TONSILLECTOMY  04/06/2017    Tonsillectomy        reports that he quit smoking about 13 years ago. His smoking use included cigarettes. He has never used smokeless tobacco. He reports that he does not currently use alcohol. He reports that he does not use drugs.    Review of Systems  Review of Systems   Respiratory:  Positive for cough.      10 point ROS completed and all are negative other than what is stated in the current HPI                               Objective    Vitals:    01/11/25 1109   BP: 117/78   Pulse: 96   Resp: 16   Temp: 36.5 °C (97.7 °F)   SpO2: 97%   Weight: 59 kg (130 lb)     No LMP for male patient.    Physical Exam  Constitutional:       Appearance: Normal appearance.   HENT:      Nose:      Right Turbinates: Enlarged and swollen.      Left Turbinates: Enlarged and swollen.      Right Sinus: Maxillary sinus tenderness present.      Left Sinus: Maxillary sinus tenderness present.      Mouth/Throat:      Pharynx: Uvula midline. Postnasal drip present. No uvula swelling.      Tonsils: No  tonsillar exudate or tonsillar abscesses.   Cardiovascular:      Rate and Rhythm: Normal rate and regular rhythm.      Pulses: Normal pulses.      Heart sounds: Normal heart sounds.   Pulmonary:      Effort: Pulmonary effort is normal.      Breath sounds: Normal breath sounds. No wheezing or rhonchi.   Musculoskeletal:      Cervical back: No tenderness.   Lymphadenopathy:      Cervical: No cervical adenopathy.   Skin:     General: Skin is warm and dry.      Findings: No rash.   Neurological:      Mental Status: He is alert and oriented to person, place, and time.   Psychiatric:         Behavior: Behavior normal.         Procedures    Point of Care Test & Imaging Results from this visit  No results found for this visit on 01/11/25.   No results found.    Diagnostic study results (if any) were reviewed by MELISSA Mcintosh.    Assessment/Plan   Allergies, medications, history, and pertinent labs/EKGs/Imaging reviewed by MELISSA Mcintosh.     Medical Decision Making  Acute Sinusitis:  - Good oral hydration  - Take abx and nasal spray as instructed  - Advised on s/s to seek emergent care for  - No erythema or edema to face  - Tylenol/Motrin as needed for pain or fever  - Mathew's vapor rub to chest; humidifier; warm showers/bath  -f/u with PCP in the next few days for re-evaluation  Acute Cough:  - Good oral hydration; avoid milk products  - Mathew's Vapor rub; humidifier; warm showers  - Take medications as prescribed  - Advised on s/s to seek emergent care for  - f/u with PCP in the next 3-5 days if no better    Orders and Diagnoses  There are no diagnoses linked to this encounter.    Medical Admin Record      Patient disposition: Home    Electronically signed by MELISSA Mcintosh  11:17 AM

## 2025-01-11 NOTE — PATIENT INSTRUCTIONS
Acute Sinusitis:  - Good oral hydration  - Take abx and nasal spray as instructed  - Advised on s/s to seek emergent care for  - No erythema or edema to face  - Tylenol/Motrin as needed for pain or fever  - Mathew's vapor rub to chest; humidifier; warm showers/bath  -f/u with PCP in the next few days for re-evaluation  Acute Cough:  - Good oral hydration; avoid milk products  - Mathew's Vapor rub; humidifier; warm showers  - Take medications as prescribed  - Advised on s/s to seek emergent care for  - f/u with PCP in the next 3-5 days if no better

## 2025-02-10 ENCOUNTER — TELEPHONE (OUTPATIENT)
Dept: GASTROENTEROLOGY | Facility: CLINIC | Age: 45
End: 2025-02-10
Payer: COMMERCIAL

## 2025-02-10 NOTE — TELEPHONE ENCOUNTER
----- Message from Tejal Chambers sent at 12/20/2024  4:27 PM EST -----  Esophagus biopsies with EoE    Duodenal biopsies, non specific increase in lymphocytes. Recommend IgA level to confirm TTG is not false negative  ----- Message -----  From: Merry Thompson RN  Sent: 12/3/2024  10:31 AM EST  To: Tejal Chambers MD

## 2025-06-23 ENCOUNTER — OFFICE VISIT (OUTPATIENT)
Dept: GASTROENTEROLOGY | Facility: CLINIC | Age: 45
End: 2025-06-23
Payer: COMMERCIAL

## 2025-06-23 VITALS
DIASTOLIC BLOOD PRESSURE: 71 MMHG | HEART RATE: 98 BPM | TEMPERATURE: 97.2 F | HEIGHT: 67 IN | BODY MASS INDEX: 20.56 KG/M2 | WEIGHT: 131 LBS | SYSTOLIC BLOOD PRESSURE: 104 MMHG

## 2025-06-23 DIAGNOSIS — R19.7 DIARRHEA, UNSPECIFIED TYPE: Primary | ICD-10-CM

## 2025-06-23 PROCEDURE — 99212 OFFICE O/P EST SF 10 MIN: CPT | Performed by: NURSE PRACTITIONER

## 2025-06-23 PROCEDURE — 3078F DIAST BP <80 MM HG: CPT | Performed by: NURSE PRACTITIONER

## 2025-06-23 PROCEDURE — 3074F SYST BP LT 130 MM HG: CPT | Performed by: NURSE PRACTITIONER

## 2025-06-23 PROCEDURE — 3008F BODY MASS INDEX DOCD: CPT | Performed by: NURSE PRACTITIONER

## 2025-06-23 PROCEDURE — 99214 OFFICE O/P EST MOD 30 MIN: CPT | Performed by: NURSE PRACTITIONER

## 2025-06-23 RX ORDER — HYOSCYAMINE SULFATE 0.125 MG
0.12 TABLET ORAL EVERY 6 HOURS PRN
Qty: 100 TABLET | Refills: 11 | Status: SHIPPED | OUTPATIENT
Start: 2025-06-23 | End: 2026-06-23

## 2025-06-23 ASSESSMENT — ENCOUNTER SYMPTOMS
RESPIRATORY NEGATIVE: 1
ROS GI COMMENTS: SEE HPI
CHILLS: 0
ENDOCRINE NEGATIVE: 1
MUSCULOSKELETAL NEGATIVE: 1
DIAPHORESIS: 0
HEMATOLOGIC/LYMPHATIC NEGATIVE: 1
NEUROLOGICAL NEGATIVE: 1
EYES NEGATIVE: 1
APNEA: 0
DIFFICULTY URINATING: 0
SHORTNESS OF BREATH: 0
CHEST TIGHTNESS: 0
CARDIOVASCULAR NEGATIVE: 1
PSYCHIATRIC NEGATIVE: 1
FATIGUE: 0
WHEEZING: 0
FEVER: 0
COUGH: 0
STRIDOR: 0
ALLERGIC/IMMUNOLOGIC NEGATIVE: 1

## 2025-06-23 ASSESSMENT — PAIN SCALES - GENERAL: PAINLEVEL_OUTOF10: 0-NO PAIN

## 2025-06-23 NOTE — PATIENT INSTRUCTIONS
You will need a colonoscopy in 2027    Continue fiber daily    Check a celiac panel     Trial of 2 weeks of lactose free diet     You can use hyoscyamine as needed     Follow-up in 1-2 months

## 2025-06-23 NOTE — PROGRESS NOTES
Subjective   Patient ID: Steven Nunez is a 45 y.o. male who presents for No chief complaint on file..   Last seen in GI on 9/2024 for diarrhea, he was lost to follow-up  PMH: Diabetes Type I, HLD      Underwent an EGD and colonoscopy on 12/2024. It was recommended to complete a celiac panel with [facility] however this was not done   Pathology  DUODENUM BIOPSY:   -- SMALL INTESTINAL MUCOSA WITH BORDERLINE INTRAEPITHELIAL LYMPHOCYTOSIS AND PRESERVED VILLOUS ARCHITECTURE. SEE NOTE.     Note:This occurs in celiac disease but has a broad differential diagnosis including drugs (e.g., olmesartan, possibly NSAIDs), non-gluten food allergies, immune-mediated diseases, etc.  No specific cause is evident in many cases.  Clinical correlation is advised.      B. STOMACH ANTRUM BIOPSY:   -- GASTRIC MUCOSA WITH NO SIGNIFICANT PATHOLOGICAL FINDINGS  -- HELICOBACTER PYLORI NOT IDENTIFIED      C. DISTAL ESOPHAGUS BIOPSY:   -- SQUAMOUS ESOPHAGEAL EOSINOPHILIA WITH UP  EOSINOPHILS PER HPF, SEE NOTE.     Note: The findings are consistent with eosinophilic esophagitis in the right clinical setting.      D. PROXIMAL ESOPHAGUS BIOPSY:   -- SQUAMOUS ESOPHAGEAL EOSINOPHILIA WITH UP TO 22 EOSINOPHILS PER HPF  -- DETACHED FRAGMENT OF SQUAMOUS EPITHELIUM WITH BIB MICROORGANISM     E. RANDOM COLON BIOPSY:   -- COLONIC MUCOSA WITH NO SIGNIFICANT PATHOLOGICAL FINDINGS      F. TRANSVERSE COLON POLYP X3:      -- FRAGMENTS OF SESSILE SERRATED ADENOMA    Electronically signed by Elijah Eller MD on 12/12/2024 at 1641 ES       He presents today for follow-up, once a week will have diarrhea  On days he does not have diarrhea will have 3-4 Bms daily, formed  He has not removed dairy but uses lactaid     Prior was having diarrhea 6-7/days of the week up to 10 times      He denies dysphagia, odynophagia, denies heartburn or reflux, nausea, vomiting, dark tarry stools, rectal bleeding.   No antibiotics recently  No international travel, no camping/well  water  No recent dietary changes  Has had some weight loss-but trying to control diabetes  Denies nocturnal Bm's       TSH in March was normal   CMP   Has  two dogs at home-looser stools-no giardia   Has not been running as much       EGD on 9/20. This showed LA grade B esophagitis, and biopsies show Note: Focal area approximately 25/hpf with deep granulation. The findings are suggestive but not diagnostic of eosinophilic esophagitis, as severe GERD also can induce increased eosinophils in the lower esophagus.     Family Hx: He denies family Hx of CRC, GI cancers, IBD, celiac       Review of Systems   Constitutional:  Negative for chills, diaphoresis, fatigue and fever.   HENT: Negative.     Eyes: Negative.    Respiratory: Negative.  Negative for apnea, cough, chest tightness, shortness of breath, wheezing and stridor.    Cardiovascular: Negative.    Gastrointestinal:         See HPI    Endocrine: Negative.    Genitourinary: Negative.  Negative for difficulty urinating.   Musculoskeletal: Negative.    Skin: Negative.    Allergic/Immunologic: Negative.    Neurological: Negative.    Hematological: Negative.    Psychiatric/Behavioral: Negative.         Objective   Physical Exam  Constitutional:       Appearance: Normal appearance. He is normal weight.   HENT:      Nose: Nose normal.   Eyes:      General: Lids are normal.   Cardiovascular:      Rate and Rhythm: Regular rhythm.   Pulmonary:      Effort: Pulmonary effort is normal.   Musculoskeletal:         General: Normal range of motion.   Neurological:      Mental Status: He is alert and oriented to person, place, and time.   Psychiatric:         Mood and Affect: Mood normal.       Assessment/Plan   Diagnoses and all orders for this visit:  Diarrhea, unspecified type        This is a 45 year old male with a PMH of HLD, type 1 DM. Last seen in GI on 9/2024 for dysphagia and diarrhea and he was lost to follow-up. Previously underwent an EGD in 2021 and found to have  esophagitis LA grade B,  25 eosinophils per HPF. As far as the swallowing, his symptoms have mostly improved with Omeprazole. He underwent an EGD in 2024 which found - SMALL INTESTINAL MUCOSA WITH BORDERLINE INTRAEPITHELIAL LYMPHOCYTOSIS AND PRESERVED VILLOUS ARCHITECTURE.   Diarrhea is ongoing for about 1 year, now having 1 episode of week he mostly thinks is dietary related.      Adenomatous polyps-repeat in 3 years (2027)  Intermittent diarrhea, - ?celiac vs IBS vs SIBO vs dietary -continue fiber, check celiac panel, 2 week trial of lactose free diet   EOE-continue Omeprazole   F/up in 1-2 months

## 2025-07-03 DIAGNOSIS — K21.9 GASTRO-ESOPHAGEAL REFLUX DISEASE WITHOUT ESOPHAGITIS: ICD-10-CM

## 2025-07-03 RX ORDER — OMEPRAZOLE 20 MG/1
20 CAPSULE, DELAYED RELEASE ORAL DAILY
Qty: 90 CAPSULE | Refills: 3 | Status: SHIPPED | OUTPATIENT
Start: 2025-07-03

## 2025-08-21 ENCOUNTER — APPOINTMENT (OUTPATIENT)
Dept: PRIMARY CARE | Facility: CLINIC | Age: 45
End: 2025-08-21
Payer: COMMERCIAL

## 2025-08-21 VITALS
WEIGHT: 132 LBS | HEART RATE: 90 BPM | BODY MASS INDEX: 20.72 KG/M2 | DIASTOLIC BLOOD PRESSURE: 78 MMHG | RESPIRATION RATE: 14 BRPM | OXYGEN SATURATION: 98 % | SYSTOLIC BLOOD PRESSURE: 110 MMHG | HEIGHT: 67 IN

## 2025-08-21 DIAGNOSIS — E10.65 UNCONTROLLED TYPE 1 DIABETES MELLITUS WITH HYPERGLYCEMIA: ICD-10-CM

## 2025-08-21 DIAGNOSIS — M79.10 MYALGIA: Primary | ICD-10-CM

## 2025-08-21 PROCEDURE — 3074F SYST BP LT 130 MM HG: CPT | Performed by: INTERNAL MEDICINE

## 2025-08-21 PROCEDURE — 3078F DIAST BP <80 MM HG: CPT | Performed by: INTERNAL MEDICINE

## 2025-08-21 PROCEDURE — 99214 OFFICE O/P EST MOD 30 MIN: CPT | Performed by: INTERNAL MEDICINE

## 2025-08-21 PROCEDURE — 3008F BODY MASS INDEX DOCD: CPT | Performed by: INTERNAL MEDICINE

## 2025-08-21 RX ORDER — ACETAMINOPHEN 160 MG/5ML
200 SUSPENSION, ORAL (FINAL DOSE FORM) ORAL DAILY
Qty: 90 CAPSULE | Refills: 3 | Status: SHIPPED | OUTPATIENT
Start: 2025-08-21

## 2025-08-21 ASSESSMENT — ENCOUNTER SYMPTOMS
NAUSEA: 0
CONSTIPATION: 0
FEVER: 0
DIARRHEA: 0
JOINT SWELLING: 0
DIAPHORESIS: 0
VOMITING: 0
COUGH: 0
CHILLS: 0
MYALGIAS: 1
SHORTNESS OF BREATH: 0

## 2025-08-21 ASSESSMENT — PATIENT HEALTH QUESTIONNAIRE - PHQ9
1. LITTLE INTEREST OR PLEASURE IN DOING THINGS: NOT AT ALL
2. FEELING DOWN, DEPRESSED OR HOPELESS: NOT AT ALL
SUM OF ALL RESPONSES TO PHQ9 QUESTIONS 1 AND 2: 0

## 2025-08-26 ENCOUNTER — APPOINTMENT (OUTPATIENT)
Dept: GASTROENTEROLOGY | Facility: CLINIC | Age: 45
End: 2025-08-26
Payer: COMMERCIAL

## 2026-02-25 ENCOUNTER — APPOINTMENT (OUTPATIENT)
Dept: PRIMARY CARE | Facility: CLINIC | Age: 46
End: 2026-02-25
Payer: COMMERCIAL